# Patient Record
Sex: MALE | Race: WHITE | Employment: FULL TIME | URBAN - METROPOLITAN AREA
[De-identification: names, ages, dates, MRNs, and addresses within clinical notes are randomized per-mention and may not be internally consistent; named-entity substitution may affect disease eponyms.]

---

## 2017-04-06 ENCOUNTER — OFFICE VISIT (OUTPATIENT)
Dept: FAMILY MEDICINE | Facility: OTHER | Age: 37
End: 2017-04-06
Payer: COMMERCIAL

## 2017-04-06 VITALS
OXYGEN SATURATION: 96 % | SYSTOLIC BLOOD PRESSURE: 118 MMHG | BODY MASS INDEX: 31.06 KG/M2 | RESPIRATION RATE: 14 BRPM | DIASTOLIC BLOOD PRESSURE: 76 MMHG | HEART RATE: 75 BPM | HEIGHT: 74 IN | TEMPERATURE: 98.3 F | WEIGHT: 242 LBS
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
## 2017-04-06 DIAGNOSIS — Z23 NEED FOR PROPHYLACTIC VACCINATION WITH TETANUS-DIPHTHERIA (TD): ICD-10-CM

## 2017-04-06 PROCEDURE — 99213 OFFICE O/P EST LOW 20 MIN: CPT | Performed by: FAMILY MEDICINE

## 2017-04-06 RX ORDER — DOXYCYCLINE 100 MG/1
200 CAPSULE ORAL ONCE
Qty: 2 CAPSULE | Refills: 0 | Status: SHIPPED | OUTPATIENT
Start: 2017-04-06 | End: 2017-04-06

## 2017-04-06 ASSESSMENT — PAIN SCALES - GENERAL: PAINLEVEL: NO PAIN (0)

## 2017-04-06 NOTE — NURSING NOTE
"Chief Complaint   Patient presents with     Tick Removal     Health Maintenance     Tetanus, lipid screen, mychart, height       Initial /76 (BP Location: Left arm, Patient Position: Chair, Cuff Size: Adult Large)  Pulse 75  Temp 98.3  F (36.8  C) (Oral)  Resp 14  Ht 6' 2\" (1.88 m)  Wt 242 lb (109.8 kg)  SpO2 96%  BMI 31.07 kg/m2 Estimated body mass index is 31.07 kg/(m^2) as calculated from the following:    Height as of this encounter: 6' 2\" (1.88 m).    Weight as of this encounter: 242 lb (109.8 kg).  Medication Reconciliation: complete   Barbra Kan CMA (AAMA)      "

## 2017-04-06 NOTE — MR AVS SNAPSHOT
After Visit Summary   4/6/2017    Jef Quintero    MRN: [id number]           Patient Information     Date Of Birth          1980        Visit Information        Provider Department      4/6/2017 10:40 AM Lexii Gilliland MD [facility]        Today's Diagnoses     Need for prophylactic vaccination with tetanus-diphtheria (TD)    -  1    Tick bite, initial encounter          Care Instructions      Tick Bite (Abx Tx)    Ticks are small insects that feed on the blood of rodents, rabbits, birds, deer, dogs and humans. The bite may cause a local reaction like that of a spider, with a small amount of local redness, itching and slight swelling. Sometimes there is no local reaction.  Most tick bites are harmless, but some ticks carry diseases, such as Lyme disease or Jeanmarie Mountain spotted fever, that can be passed to people at the time of the bite. Lyme disease is of greatest concern. At the present time, you have no symptoms of Lyme disease or other serious reaction to the bite. It is important to watch for the warning signs, which could appear weeks to months after the tick bite.  Home care  The following guidelines can help you care for your bite at home:  1. If itching is a problem, avoid tight clothing and anything that heats up your skin (hot showers/baths, direct sunlight). This often makes the itching worse. Use ice packs to help with redness and itching.  2. An ice pack (ice cubes in a plastic bag, wrapped in a towel) will reduce local areas of redness and itching. Over-the-counter creams containing benzocaine may help with itching.  3. If large areas of the skin are involved and if no other antihistamine was prescribed, over-the-counter antihistamines with diphenhydramine can be used. These are available at drug and grocery stores. These may be used to reduce itching if large areas of the skin are involved. If these are not helpful, talk to your doctor or pharmacist about other  over-the counter medicines that may be helpful.  4. Antibiotics may be prescribed to reduce your risk of getting Lyme disease. It is very important that you take them exactly as directed until they are completely finished.  Follow-up care  Follow up with your doctor or as advised.  When to seek medical care  Get prompt medical attention if any of the following occur:  Signs off local infection (the next few days) include:    Increasing redness around the bite site    Increased pain or swelling    Fever over 100.4 F (38.0 C)    Fluid draining from the bite area  Signs of tick-related disease (next few weeks to months) include:    Circular red ring-like rash appears at the bite area within 1 to 3 weeks    Tiredness, fever or chills, nausea or vomiting    Neck pain or stiffness, headache, confusion    Muscle, bone aching    Irregular or rapid heart beat    Joint pain or swelling (especially the knee joint)    Numbness or tingling or weakness in the arms or legs    Weakness on one side of the face    8784-5187 [employer]. [address] [place] [address]. All rights reserved. This information is not intended as a substitute for professional medical care. Always follow your healthcare professional's instructions.              Follow-ups after your visit        Who to contact     If you have questions or need follow up information about today's clinic visit or your schedule please contact [facility] directly at [phone].  Normal or non-critical lab and imaging results will be communicated to you by MyChart, letter or phone within 4 business days after the clinic has received the results. If you do not hear from us within 7 days, please contact the clinic through MyChart or phone. If you have a critical or abnormal lab result, we will notify you by phone as soon as possible.  Submit refill requests through [employer] or call your pharmacy and they will forward the refill request to  "us. Please allow 3 business days for your refill to be completed.          Additional Information About Your Visit        MyChart Information     [employer] lets you send messages to your doctor, view your test results, renew your prescriptions, schedule appointments and more. To sign up, go to [email or web address] . Click on \"Log in\" on the left side of the screen, which will take you to the Welcome page. Then click on \"Sign up Now\" on the right side of the page.     You will be asked to enter the access code listed below, as well as some personal information. Please follow the directions to create your username and password.     Your access code is: [email or web address]  Expires: 2017 12:13 PM     Your access code will  in 90 days. If you need help or a new code, please call your [facility] clinic or [phone].        Care EveryWhere ID     This is your Care EveryWhere ID. This could be used by other organizations to access your [facility] medical records  [id number]        Your Vitals Were     Pulse Temperature Respirations Height Pulse Oximetry BMI (Body Mass Index)    75 98.3  F (36.8  C) (Oral) 14 6' 2\" (1.88 m) 96% 31.07 kg/m2       Blood Pressure from Last 3 Encounters:   17 118/76   12/21/15 124/73   13 103/64    Weight from Last 3 Encounters:   17 242 lb (109.8 kg)   10/23/02 206 lb (93.4 kg)   10/02/01 203 lb (92.1 kg)              Today, you had the following     No orders found for display         Today's Medication Changes          These changes are accurate as of: 17 12:13 PM.  If you have any questions, ask your nurse or doctor.               Start taking these medicines.        Dose/Directions    doxycycline Monohydrate 100 MG Caps   Used for:  Tick bite, initial encounter   Started by:  Lexii Gilliland MD        Dose:  200 mg   Take 2 capsules (200 mg) by mouth once for 1 dose   Quantity:  2 capsule   Refills:  0            Where to get your medicines      These " medications were sent to [facility] [address] [place], [place] - [address] [facility]  [address] [facility] [address]     Phone:  [phone]     doxycycline Monohydrate 100 MG Caps                Primary Care Provider    None       No address on file        Thank you!     Thank you for choosing [facility]  for your care. Our goal is always to provide you with excellent care. Hearing back from our patients is one way we can continue to improve our services. Please take a few minutes to complete the written survey that you may receive in the mail after your visit with us. Thank you!             Your Updated Medication List - Protect others around you: Learn how to safely use, store and throw away your medicines at www.disposemymeds.org.          This list is accurate as of: 4/6/17 12:13 PM.  Always use your most recent med list.                   Brand Name Dispense Instructions for use    CLARITIN 10 MG capsule   Generic drug:  loratadine      Take 10 mg by mouth daily Reported on 4/6/2017       doxycycline Monohydrate 100 MG Caps     2 capsule    Take 2 capsules (200 mg) by mouth once for 1 dose       guaiFENesin-codeine 100-10 MG/5ML Soln solution    ROBITUSSIN AC    120 mL    Take 5-10 mLs by mouth every 6 hours as needed for cough       TYLENOL PO      Reported on 4/6/2017

## 2017-04-06 NOTE — PROGRESS NOTES
SUBJECTIVE:                                                    Jef Quintero is a 36 year old male who presents to clinic today for the following health issues:      HPI    Concern - Tick on Stomach     Onset: April 5th, 2017    Description:   Embedded on Stomach- Wife tried to get it off, but with no sucess    Intensity: mild    Progression of Symptoms:  No Symptoms    Accompanying Signs & Symptoms:  NA       Previous history of similar problem:   NA    Precipitating factors:   Worsened by: NA    Alleviating factors:  Improved by: NA       Therapies Tried and outcome: Wife tried to get it off yesterday, but no sucess      Problem list and histories reviewed & adjusted, as indicated.  Additional history: as documented        Patient Active Problem List   Diagnosis     Other specified disorder of rectum and anus     Tick bite, initial encounter     Past Surgical History:   Procedure Laterality Date     C REVISE THUMB TENDON  1999    repair torn ligament after right thumb sprain     HC REMOVE TONSILS/ADENOIDS,<10 Y/O  1984    T & A <11y.o.       Social History   Substance Use Topics     Smoking status: Never Smoker     Smokeless tobacco: Current User     Alcohol use No     Family History   Problem Relation Age of Onset     CANCER Maternal Grandfather      unknown type     CANCER Maternal Aunt      unknown type     DIABETES No family hx of      HEART DISEASE No family hx of      Lipids No family hx of          Current Outpatient Prescriptions   Medication Sig Dispense Refill     doxycycline Monohydrate 100 MG CAPS Take 2 capsules (200 mg) by mouth once for 1 dose 2 capsule 0     Acetaminophen (TYLENOL PO) Reported on 4/6/2017       guaiFENesin-codeine (ROBITUSSIN AC) 100-10 MG/5ML SOLN Take 5-10 mLs by mouth every 6 hours as needed for cough (Patient not taking: Reported on 4/6/2017) 120 mL 0     Loratadine (CLARITIN) 10 MG capsule Take 10 mg by mouth daily Reported on 4/6/2017       Allergies   Allergen Reactions      "Erythromycin      SOB     Penicillins      unknown     BP Readings from Last 3 Encounters:   04/06/17 118/76   12/21/15 124/73   04/17/13 103/64    Wt Readings from Last 3 Encounters:   04/06/17 242 lb (109.8 kg)   10/23/02 206 lb (93.4 kg)   10/02/01 203 lb (92.1 kg)                  Labs reviewed in EPIC    ROS:  Constitutional, HEENT, cardiovascular, pulmonary, gi and gu systems are negative, except as otherwise noted.    OBJECTIVE:                                                    /76 (BP Location: Left arm, Patient Position: Chair, Cuff Size: Adult Large)  Pulse 75  Temp 98.3  F (36.8  C) (Oral)  Resp 14  Ht 6' 2\" (1.88 m)  Wt 242 lb (109.8 kg)  SpO2 96%  BMI 31.07 kg/m2  Body mass index is 31.07 kg/(m^2).  Physical Exam   Constitutional: He appears well-developed and well-nourished.   HENT:   Head: Normocephalic and atraumatic.   Cardiovascular: Normal rate and regular rhythm.    Pulmonary/Chest: Effort normal and breath sounds normal.   Skin:   Tick bite with a small part of the tick still embedded in the skin - removed     Diagnostic Test Results:  none      ASSESSMENT/PLAN:                                                      Problem List Items Addressed This Visit     Tick bite, initial encounter - Primary     Part of the tick was removed in clinic today  < 72 hr of exposure  Will do a single dose of doxyccycline for prophylaxis  Discussed home care  Reportable signs and symptoms discussed  RTC if symptoms persist or fail to improve           Relevant Medications    doxycycline Monohydrate 100 MG CAPS      Other Visit Diagnoses     Need for prophylactic vaccination with tetanus-diphtheria (TD)             eLxii Gilliland MD  [facility]  "

## 2017-04-06 NOTE — ASSESSMENT & PLAN NOTE
Part of the tick was removed in clinic today  < 72 hr of exposure  Will do a single dose of doxyccycline for prophylaxis  Discussed home care  Reportable signs and symptoms discussed  RTC if symptoms persist or fail to improve

## 2017-04-06 NOTE — PATIENT INSTRUCTIONS
Tick Bite (Abx Tx)    Ticks are small insects that feed on the blood of rodents, rabbits, birds, deer, dogs and humans. The bite may cause a local reaction like that of a spider, with a small amount of local redness, itching and slight swelling. Sometimes there is no local reaction.  Most tick bites are harmless, but some ticks carry diseases, such as Lyme disease or Jeanmarie Mountain spotted fever, that can be passed to people at the time of the bite. Lyme disease is of greatest concern. At the present time, you have no symptoms of Lyme disease or other serious reaction to the bite. It is important to watch for the warning signs, which could appear weeks to months after the tick bite.  Home care  The following guidelines can help you care for your bite at home:  1. If itching is a problem, avoid tight clothing and anything that heats up your skin (hot showers/baths, direct sunlight). This often makes the itching worse. Use ice packs to help with redness and itching.  2. An ice pack (ice cubes in a plastic bag, wrapped in a towel) will reduce local areas of redness and itching. Over-the-counter creams containing benzocaine may help with itching.  3. If large areas of the skin are involved and if no other antihistamine was prescribed, over-the-counter antihistamines with diphenhydramine can be used. These are available at drug and grocery stores. These may be used to reduce itching if large areas of the skin are involved. If these are not helpful, talk to your doctor or pharmacist about other over-the counter medicines that may be helpful.  4. Antibiotics may be prescribed to reduce your risk of getting Lyme disease. It is very important that you take them exactly as directed until they are completely finished.  Follow-up care  Follow up with your doctor or as advised.  When to seek medical care  Get prompt medical attention if any of the following occur:  Signs off local infection (the next few days)  include:    Increasing redness around the bite site    Increased pain or swelling    Fever over 100.4 F (38.0 C)    Fluid draining from the bite area  Signs of tick-related disease (next few weeks to months) include:    Circular red ring-like rash appears at the bite area within 1 to 3 weeks    Tiredness, fever or chills, nausea or vomiting    Neck pain or stiffness, headache, confusion    Muscle, bone aching    Irregular or rapid heart beat    Joint pain or swelling (especially the knee joint)    Numbness or tingling or weakness in the arms or legs    Weakness on one side of the face    0468-9720 [employer] [address] [place], [place] [id number]. All rights reserved. This information is not intended as a substitute for professional medical care. Always follow your healthcare professional's instructions.

## 2017-07-30 ENCOUNTER — OFFICE VISIT (OUTPATIENT)
Dept: URGENT CARE | Facility: RETAIL CLINIC | Age: 37
End: 2017-07-30
Payer: COMMERCIAL

## 2017-07-30 VITALS — DIASTOLIC BLOOD PRESSURE: 90 MMHG | SYSTOLIC BLOOD PRESSURE: 126 MMHG | TEMPERATURE: 98.3 F | HEART RATE: 77 BPM

## 2017-07-30 DIAGNOSIS — J01.90 ACUTE SINUSITIS WITH COEXISTING CONDITION, NEED PROPHYLACTIC TREATMENT: Primary | ICD-10-CM

## 2017-07-30 PROCEDURE — 99213 OFFICE O/P EST LOW 20 MIN: CPT | Performed by: PHYSICIAN ASSISTANT

## 2017-07-30 RX ORDER — CEFDINIR 300 MG/1
600 CAPSULE ORAL DAILY
Qty: 20 CAPSULE | Refills: 0 | Status: SHIPPED | OUTPATIENT
Start: 2017-07-30 | End: 2017-08-09

## 2017-07-30 RX ORDER — PSEUDOEPHEDRINE HYDROCHLORIDE 120 MG/1
TABLET, FILM COATED, EXTENDED RELEASE ORAL SEE ADMIN INSTRUCTIONS
Refills: 0 | COMMUNITY
Start: 2017-07-28 | End: 2017-07-30

## 2017-07-30 RX ORDER — ALBUTEROL SULFATE 90 UG/1
AEROSOL, METERED RESPIRATORY (INHALATION)
Refills: 0 | COMMUNITY
Start: 2017-02-22

## 2017-07-30 NOTE — PATIENT INSTRUCTIONS
Omnicef daily for 10 days.  Continue Claritin daily.  Sudafed behind the pharmacist counter for 3-5 days helps relieve congestion  Flonase 2 sprays in each nostril daily until symptoms resolve, then continue 1 spray in each nostril for at least 5 more days.  Afrin (oxymetazoline) nasal spray twice daily for 3 days. Stop after 3 days.  Take Tylenol or an NSAID such as ibuprofen or naproxen as needed for pain.  May use netti pot with bottled or distilled water and saline packets to flush sinuses.  Sit in the bathroom with the door closed and hot shower running to loosen mucus.  Contact primary care clinic if you do not have any relief from your symptoms after 10 days.  Present to emergency room for significantly increasing pain, persistent high fever >102F, swelling/redness around your eyes, changes in your vision or ability to move your eyes, altered mental status or a severe headache.

## 2017-07-30 NOTE — PROGRESS NOTES
Chief Complaint   Patient presents with     Sinus Problem     x 1 week, patient thinks fevers the first 2 days      SUBJECTIVE:  Jef Quintero is a 37 year old male here with concerns about sinus infection.  He states onset of symptoms was 1 week ago.    Course of illness is worsening.   Severity moderate  He has had maxillary pressure as well as fever with chills/sweats for the first couple days, headache and nasal congestion. Worse when bending over.  Predisposing factors include none.   Recent treatment has included: Antihistamine and Decongestants    Past Medical History:   Diagnosis Date     Varicella without mention of complication      Current Outpatient Prescriptions   Medication Sig Dispense Refill     Pseudoephedrine HCl (SUDAFED PO)        VENTOLIN  (90 BASE) MCG/ACT Inhaler   0     cefdinir (OMNICEF) 300 MG capsule Take 2 capsules (600 mg) by mouth daily for 10 days 20 capsule 0     Acetaminophen (TYLENOL PO) Reported on 4/6/2017       Loratadine (CLARITIN) 10 MG capsule Take 10 mg by mouth daily Reported on 4/6/2017       Social History   Substance Use Topics     Smoking status: Never Smoker     Smokeless tobacco: Current User     Alcohol use No     Allergies   Allergen Reactions     Erythromycin      SOB     Penicillins      unknown     ROS:  Review of systems negative except as stated above.    OBJECTIVE:  /90 (BP Location: Left arm)  Pulse 77  Temp 98.3  F (36.8  C) (Temporal)  GENERAL APPEARANCE: healthy, alert and no distress  EYES: PERRL, conjunctiva clear  HENT: Pain with palpation over frontal and maxillary sinuses. Ear canals normal. Left TM is bulging with a purulent effusion and erythema. Normal landmarks are not visible. Right TM is in neutral position with erythema but without effusion. Normal landmarks are visible. Nasal turbinates edematous and boggy with purulent discharge bilaterally. Posterior pharynx is not erythematous.  NECK: supple, nontender, no  lymphadenopathy  RESP: lungs clear to auscultation - no rales, rhonchi or wheezes  CV: regular rates and rhythm, normal S1 S2, no murmur noted    ASSESSMENT:    ICD-10-CM    1. Acute sinusitis with coexisting condition, need prophylactic treatment J01.90 cefdinir (OMNICEF) 300 MG capsule       PLAN:   Patient Instructions   Omnicef daily for 10 days.  Continue Claritin daily.  Sudafed behind the pharmacist counter for 3-5 days helps relieve congestion  Flonase 2 sprays in each nostril daily until symptoms resolve, then continue 1 spray in each nostril for at least 5 more days.  Afrin (oxymetazoline) nasal spray twice daily for 3 days. Stop after 3 days.  Take Tylenol or an NSAID such as ibuprofen or naproxen as needed for pain.  May use netti pot with bottled or distilled water and saline packets to flush sinuses.  Sit in the bathroom with the door closed and hot shower running to loosen mucus.  Contact primary care clinic if you do not have any relief from your symptoms after 10 days.  Present to emergency room for significantly increasing pain, persistent high fever >102F, swelling/redness around your eyes, changes in your vision or ability to move your eyes, altered mental status or a severe headache.    Follow up with primary care provider with any problems, questions or concerns or if symptoms worsen or fail to improve. Patient agreed to plan and verbalized understanding.    Lani Shah PA-C  [facility]

## 2018-02-09 ENCOUNTER — OFFICE VISIT (OUTPATIENT)
Dept: URGENT CARE | Facility: RETAIL CLINIC | Age: 38
End: 2018-02-09
Payer: COMMERCIAL

## 2018-02-09 VITALS — HEART RATE: 68 BPM | TEMPERATURE: 98.5 F | DIASTOLIC BLOOD PRESSURE: 82 MMHG | SYSTOLIC BLOOD PRESSURE: 135 MMHG

## 2018-02-09 DIAGNOSIS — H10.32 ACUTE CONJUNCTIVITIS OF LEFT EYE, UNSPECIFIED ACUTE CONJUNCTIVITIS TYPE: Primary | ICD-10-CM

## 2018-02-09 PROCEDURE — 99213 OFFICE O/P EST LOW 20 MIN: CPT | Performed by: INTERNAL MEDICINE

## 2018-02-09 RX ORDER — TOBRAMYCIN 3 MG/ML
1 SOLUTION/ DROPS OPHTHALMIC EVERY 4 HOURS
Qty: 1 BOTTLE | Refills: 0 | Status: SHIPPED | OUTPATIENT
Start: 2018-02-09 | End: 2018-02-16

## 2018-02-09 NOTE — PROGRESS NOTES
[facility] Care Progress Note        Simran West MD, MPH  02/09/2018        History:      Jef Quintero is a pleasant 37 year old year old male with Irritation and redness of the left eye, With whitish/yellowish Drainage since yesterday. No change in visual accuity. No fever or illness. No cough or shortness of breath. No headache or neck pain. No trauma to the eye. No photophobia. No nasal drainage.          Assessment and Plan:        Acute conjunctivitis of left eye, unspecified acute conjunctivitis type  - tobramycin (TOBREX) 0.3 % ophthalmic solution; Apply 1 drop to eye every 4 hours for 7 days  Dispense: 1 Bottle; Refill: 0  Advised not to wear contact lens until 4-5 days after resolution of left eye infection.  Advised to wash hands meticulously before and after caring for the eye.  F/u w PCP or eye doctor in 2 days, earlier if symptoms worsen.                   Physical Exam:      /82 (BP Location: Left arm)  Pulse 68  Temp 98.5  F (36.9  C) (Oral)     Constitutional: Patient is in no distress The patient is pleasant and cooperative.   HEENT: Head:  Head is atraumatic, normocephalic.    Eyes: Pupils are equal, round and reactive to light and accomodation.  Sclera is non-icteric. Left conjunctival injection, and yellowish exudate noted. Extraocular motion is intact. Visual acuity is intact bilaterally.  Ears:  External acoustic canals are patent and clear.  There is no erythema and bulging( exudate)  of the ( R/L ) tympanic membrane(s ).   Nose:  No Nasal congestion w/o drainage or mucosal ulceration is noted.  Throat:  Oral mucosa is moist.  No oral lesions are noted.  No posterior pharyngeal hyperemia nor exudate noted.     Neck Supple.  There is no cervical lymphadenopathy.  No nuchal rigidity noted.  There is no meningismus.     Cardiovascular: Heart is regular to rate and rhythm.  No murmur is noted.     Lungs: Clear in the anterior and posterior pulmonary fields.   Abdomen:  Soft and non-tender.    Back No flank tenderness is noted.   Extremeties No edema, no calf tenderness.   Neuro: No focal deficit.   Skin No petechiae or purpura is noted.  There is no rash.   Mood Normal              Data:      All new lab and imaging data was reviewed.   Results for orders placed or performed in visit on 04/17/13   Beta strep group A culture   Result Value Ref Range    Beta Strep      Impression    No Beta Hemolytic Strep Group A isolated. DS    Rapid strep screen   Result Value Ref Range    Rapid Strep A Screen negative neg

## 2018-02-09 NOTE — MR AVS SNAPSHOT
"              After Visit Summary   2018    Jef Quintero    MRN: [id number]           Patient Information     Date Of Birth          1980        Visit Information        Provider Department      2018 11:30 AM Simran West MD [facility]        Today's Diagnoses     Acute conjunctivitis of left eye, unspecified acute conjunctivitis type    -  1       Follow-ups after your visit        Who to contact     You can reach your care team any time of the day by calling [phone].  Notification of test results:  If you have an abnormal lab result, we will notify you by phone as soon as possible.         Additional Information About Your Visit        MyChart Information     [employer] lets you send messages to your doctor, view your test results, renew your prescriptions, schedule appointments and more. To sign up, go to [email or web address] . Click on \"Log in\" on the left side of the screen, which will take you to the Welcome page. Then click on \"Sign up Now\" on the right side of the page.     You will be asked to enter the access code listed below, as well as some personal information. Please follow the directions to create your username and password.     Your access code is: [email or web address]  Expires: 5/10/2018 12:30 PM     Your access code will  in 90 days. If you need help or a new code, please call your [place] clinic or [phone].        Care EveryWhere ID     This is your [facility] EveryWhere ID. This could be used by other organizations to access your [facility] medical records  [id number]        Your Vitals Were     Pulse Temperature                68 98.5  F (36.9  C) (Oral)           Blood Pressure from Last 3 Encounters:   18 135/82   17 126/90   17 118/76    Weight from Last 3 Encounters:   17 242 lb (109.8 kg)   10/23/02 206 lb (93.4 kg)   10/02/01 203 lb (92.1 kg)              Today, you had the following     No orders found for display       "   Today's Medication Changes          These changes are accurate as of 2/9/18 12:31 PM.  If you have any questions, ask your nurse or doctor.               Start taking these medicines.        Dose/Directions    tobramycin 0.3 % ophthalmic solution   Commonly known as:  TOBREX   Used for:  Acute conjunctivitis of left eye, unspecified acute conjunctivitis type        Dose:  1 drop   Apply 1 drop to eye every 4 hours for 7 days   Quantity:  1 Bottle   Refills:  0            Where to get your medicines      These medications were sent to [id number] - [place], [place] - [address] [address]  [address] [address] [address]     Phone:  [phone]     tobramycin 0.3 % ophthalmic solution                Primary Care Provider Office Phone # Fax #    [facility] [phone]       [address]  [place] [facility] [address]        Equal Access to Services     ADDISON MASTERS : Hadii aad ku hadasho Soangel, waaxda luqadaha, qaybta kaalmada adeegyada, geovanny lagunas . So [facility] [phone].    ATENCIÓN: Si habla español, tiene a nicole disposición servicios gratuitos de asistencia lingüística. Llame al [phone].    We comply with applicable federal civil rights laws and [place] laws. We do not discriminate on the basis of race, color, national origin, age, disability, sex, sexual orientation, or gender identity.            Thank you!     Thank you for choosing [facility]  for your care. Our goal is always to provide you with excellent care. Hearing back from our patients is one way we can continue to improve our services. Please take a few minutes to complete the written survey that you may receive in the mail after your visit with us. Thank you!             Your Updated Medication List - Protect others around you: Learn how to safely use, store and throw away your medicines at www.disposemymeds.org.          This list is accurate as  of 2/9/18 12:31 PM.  Always use your most recent med list.                   Brand Name Dispense Instructions for use Diagnosis    CLARITIN 10 MG capsule   Generic drug:  loratadine      Take 10 mg by mouth daily Reported on 4/6/2017        SUDAFED PO           tobramycin 0.3 % ophthalmic solution    TOBREX    1 Bottle    Apply 1 drop to eye every 4 hours for 7 days    Acute conjunctivitis of left eye, unspecified acute conjunctivitis type       TYLENOL PO      Reported on 4/6/2017        VENTOLIN  (90 BASE) MCG/ACT Inhaler   Generic drug:  albuterol